# Patient Record
Sex: FEMALE | Race: WHITE | NOT HISPANIC OR LATINO | Employment: FULL TIME | URBAN - NONMETROPOLITAN AREA
[De-identification: names, ages, dates, MRNs, and addresses within clinical notes are randomized per-mention and may not be internally consistent; named-entity substitution may affect disease eponyms.]

---

## 2018-01-19 ENCOUNTER — OFFICE VISIT (OUTPATIENT)
Dept: OBSTETRICS AND GYNECOLOGY | Facility: CLINIC | Age: 33
End: 2018-01-19

## 2018-01-19 VITALS
HEART RATE: 74 BPM | HEIGHT: 69 IN | BODY MASS INDEX: 21.12 KG/M2 | DIASTOLIC BLOOD PRESSURE: 62 MMHG | WEIGHT: 142.6 LBS | SYSTOLIC BLOOD PRESSURE: 102 MMHG | RESPIRATION RATE: 16 BRPM

## 2018-01-19 DIAGNOSIS — B37.31 CANDIDA VAGINITIS: ICD-10-CM

## 2018-01-19 DIAGNOSIS — Z01.419 ENCOUNTER FOR GYNECOLOGICAL EXAMINATION WITH PAPANICOLAOU SMEAR OF CERVIX: Primary | ICD-10-CM

## 2018-01-19 DIAGNOSIS — Z30.09 GENERAL COUNSELING AND ADVICE FOR CONTRACEPTIVE MANAGEMENT: ICD-10-CM

## 2018-01-19 PROCEDURE — 99395 PREV VISIT EST AGE 18-39: CPT | Performed by: NURSE PRACTITIONER

## 2018-01-19 PROCEDURE — 87210 SMEAR WET MOUNT SALINE/INK: CPT | Performed by: NURSE PRACTITIONER

## 2018-01-19 PROCEDURE — 88142 CYTOPATH C/V THIN LAYER: CPT | Performed by: PATHOLOGY

## 2018-01-19 PROCEDURE — 88141 CYTOPATH C/V INTERPRET: CPT | Performed by: PATHOLOGY

## 2018-01-19 RX ORDER — BUSPIRONE HYDROCHLORIDE 7.5 MG/1
7.5 TABLET ORAL 2 TIMES DAILY
COMMUNITY
End: 2018-01-30 | Stop reason: SDUPTHER

## 2018-01-19 RX ORDER — EPINEPHRINE 0.3 MG/.3ML
0.3 INJECTION SUBCUTANEOUS
COMMUNITY
Start: 2017-09-18

## 2018-01-20 NOTE — PROGRESS NOTES
Subjective   Chief Complaint   Patient presents with   • Gynecologic Exam     well woman annual visit   • Contraception     Nelia Nuñez is a 32 y.o. year old  presenting to be seen for her annual exam.  Today she is interested in having mirena placed. Has had one before and did well. Currently not sexually active. Patient's last menstrual period was 2018 (exact date). Will precert and pt to RTC for insertion when next menses begins.     OB History      Para Term  AB Living    1 1        SAB TAB Ectopic Multiple Live Births                  Current birth control method: abstinence.    She is not sexually active.  In the past 12 months there has not been new sexual partners.  Condoms are not typically used.  She would not like to be screened for STD's at today's exam.     Past 6 month menstrual history:    Cycle Frequency: regular, predictable and consistent every 28 - 32 days   Menstrual cycle character: flow is typically moderately heavy   Cycle Duration: 3 - 4   Number of heavy days of flows: 2   Dysmenorrhea: moderate   PMS: is not affecting her activities of daily living   Intermenstrual bleeding present: no   Post-coital bleeding present: no     She exercises regularly: yes.  She wears her seat belt:yes.  She has concerns about domestic violence: no.  Last colonoscopy: Never  Last DEXA: Never  Last MMG: Never  Last pap:  16  History of abnormal PAP: Yes, HPV positive    The following portions of the patient's history were reviewed and updated as appropriate:problem list, current medications, allergies, past family history, past medical history, past social history and past surgical history.    Smoking status: Never Smoker                                                              Smokeless status: Never Used                        History   Alcohol Use   • Yes      Review of Systems   Constitutional: Negative.    HENT:        Sinusitis, on antibiotics   Respiratory: Negative.   "  Cardiovascular: Negative.    Gastrointestinal: Negative.    Endocrine: Negative.    Genitourinary: Negative.    Skin: Negative.    Neurological: Negative for dizziness, syncope, light-headedness and headaches.   Psychiatric/Behavioral: Negative for suicidal ideas. The patient is nervous/anxious.          Objective   /62 (BP Location: Right arm, Patient Position: Sitting, Cuff Size: Adult)  Pulse 74  Resp 16  Ht 175.3 cm (69\")  Wt 64.7 kg (142 lb 9.6 oz)  LMP 01/04/2018 (Exact Date)  Breastfeeding? No  BMI 21.06 kg/m2    General:  well developed; well nourished  no acute distress   Skin:  No suspicious lesions seen   Thyroid: normal to inspection and palpation   Breasts:  Examined in supine position  Symmetric without masses or skin dimpling  Nipples normal without inversion, lesions or discharge  There are no palpable axillary nodes  Fibrocystic changes are present both breasts without a discrete mass   Abdomen: soft, non-tender; no masses  no umbilical or inginual hernias are present  no hepato-splenomegaly  Normal findings: bowel sounds normal   Cardiac: Heart sounds are normal.  Regular rate and rhythm without murmur, gallop or rub.   Resp: Normal expansion.  Clear to auscultation.  No rales, rhonchi, or wheezing.   Psych: alert,oriented, in NAD with a full range of affect, normal behavior and no psychotic features   Pelvis: Uterus:  Clinical staff was present for exam  External genitalia:  normal appearance of the external genitalia including Bartholin's and Hondo's glands.  :  urethral meatus normal;  Vaginal:  normal pink mucosa without prolapse or lesions, discharge present -  white and thick and wet prep done: finding =  2+ yeast buds, no clue cells or trichomonads.   Cervix:  normal appearance.  Uterus:  normal size, shape and consistency  Adnexa:  normal bimanual exam of the adnexa.     Lab Review   No data reviewed    Imaging  No data reviewed       Diagnoses and all orders for this " visit:    Encounter for gynecological examination with Papanicolaou smear of cervix  -     Liquid-based Pap Smear, Screening - ThinPrep Vial, Cervix    Candida Vaginitis   -     Clotrimazole 2 % vaginal cream; Insert 1 applicator into the vagina Daily for 3 days.    General counseling and advice on contraceptive management    Will pre-cert for mirena and pt to RTC for insertion during next menses.       This note was electronically signed.    Jada Philip, APRN  January 20, 2018

## 2018-01-24 LAB
LAB AP CASE REPORT: NORMAL
LAB AP GYN ADDITIONAL INFORMATION: NORMAL
LAB AP GYN OTHER FINDINGS: NORMAL
Lab: NORMAL
PATH INTERP SPEC-IMP: NORMAL
STAT OF ADQ CVX/VAG CYTO-IMP: NORMAL

## 2018-01-30 ENCOUNTER — PROCEDURE VISIT (OUTPATIENT)
Dept: OBSTETRICS AND GYNECOLOGY | Facility: CLINIC | Age: 33
End: 2018-01-30

## 2018-01-30 VITALS
SYSTOLIC BLOOD PRESSURE: 104 MMHG | RESPIRATION RATE: 14 BRPM | HEIGHT: 69 IN | BODY MASS INDEX: 21.27 KG/M2 | DIASTOLIC BLOOD PRESSURE: 60 MMHG | WEIGHT: 143.6 LBS | HEART RATE: 72 BPM

## 2018-01-30 DIAGNOSIS — N92.0 MENORRHAGIA WITH REGULAR CYCLE: ICD-10-CM

## 2018-01-30 DIAGNOSIS — Z30.430 ENCOUNTER FOR INSERTION OF MIRENA IUD: Primary | ICD-10-CM

## 2018-01-30 LAB
B-HCG UR QL: NEGATIVE
INTERNAL NEGATIVE CONTROL: NEGATIVE
INTERNAL POSITIVE CONTROL: POSITIVE
Lab: NORMAL

## 2018-01-30 PROCEDURE — 58300 INSERT INTRAUTERINE DEVICE: CPT | Performed by: NURSE PRACTITIONER

## 2018-01-30 PROCEDURE — 81025 URINE PREGNANCY TEST: CPT | Performed by: NURSE PRACTITIONER

## 2018-01-30 RX ORDER — BUSPIRONE HYDROCHLORIDE 7.5 MG/1
7.5 TABLET ORAL 2 TIMES DAILY
Qty: 60 TABLET | Refills: 5 | Status: SHIPPED | OUTPATIENT
Start: 2018-01-30

## 2018-01-30 NOTE — PROGRESS NOTES
IUD Insertion    Patient's last menstrual period was 01/29/2018.    Date of procedure:  1/30/2018    Risks and benefits discussed? yes  All questions answered? yes  Consents given by The patient  Written consent obtained? yes    Local anesthesia used:  no    Procedure documentation:    After verifying the patient had a low probability of being pregnant and met the criteria for insertion, a sterile speculum has placed and the cervix was cleansed with an antiseptic solution.  Vaginal discharge was scant.  The anterior lip of the cervix was grasped with a tenaculum and the uterine cavity was gently sounded. There was no difficulty passing the sound through the cervix.  Cervical dilation did not need to be performed prior to placing the IUD.  The uterus was anteverted and sounded to 8 cms.  The Mirena was then prepared per the manufacturers instructions.    The Mirena was advanced to a point 2 cms from the fundus and then the arms were released from the sheath.  The device was advanced to the fundus and the device was released fully from the sheath.. The string was cut 3 cms in length.  Bleeding from the cervix was scant.    She tolerated the procedure without any difficulty.    Post procedure instructions: Call if any fever or excessive bleeding or pain.    Follow up needed:  6 weeks for string check    Diagnosis:   Encounter for insertion of Mirena IUD    This note was electronically signed.    Jada Philip, APRN  1/30/2018

## 2018-03-13 ENCOUNTER — OFFICE VISIT (OUTPATIENT)
Dept: OBSTETRICS AND GYNECOLOGY | Facility: CLINIC | Age: 33
End: 2018-03-13

## 2018-03-13 VITALS
WEIGHT: 147 LBS | RESPIRATION RATE: 14 BRPM | HEIGHT: 69 IN | BODY MASS INDEX: 21.77 KG/M2 | HEART RATE: 86 BPM | SYSTOLIC BLOOD PRESSURE: 100 MMHG | DIASTOLIC BLOOD PRESSURE: 62 MMHG

## 2018-03-13 DIAGNOSIS — J01.00 ACUTE NON-RECURRENT MAXILLARY SINUSITIS: ICD-10-CM

## 2018-03-13 DIAGNOSIS — Z30.431 IUD CHECK UP: Primary | ICD-10-CM

## 2018-03-13 PROCEDURE — 99213 OFFICE O/P EST LOW 20 MIN: CPT | Performed by: NURSE PRACTITIONER

## 2018-03-13 RX ORDER — ALBUTEROL SULFATE 90 UG/1
2 AEROSOL, METERED RESPIRATORY (INHALATION) EVERY 4 HOURS PRN
Qty: 8 G | Refills: 0 | Status: SHIPPED | OUTPATIENT
Start: 2018-03-13 | End: 2018-12-05 | Stop reason: SDUPTHER

## 2018-03-13 RX ORDER — CYCLOBENZAPRINE HCL 10 MG
10 TABLET ORAL 3 TIMES DAILY PRN
COMMUNITY

## 2018-03-13 RX ORDER — AZITHROMYCIN 250 MG/1
TABLET, FILM COATED ORAL
Qty: 6 TABLET | Refills: 0 | Status: SHIPPED | OUTPATIENT
Start: 2018-03-13 | End: 2018-06-08

## 2018-03-13 NOTE — PROGRESS NOTES
"Subjective   Nelia Nuñez is a 33 y.o. female.     History of Present Illness   Pt presents for 6 week string check on Mirena IUD. Pt states she has had consistent spotting with some clots then a defined period on 3/4-3/10 when her bleeding was light to heavy x 6 days. She notes this is very different from her normal pattern but she full understands this can occur with IUDs. She also notes some \"bloating like she is on her period\". She has not been able to check her strings. She has had intercourse and it was not painful for her or her partner.     Pt complains of a pain and pressure over the right maxillary sinus. Pt has hx of extensive facial reconstruction and has frequent sinusitis. She currently does not have a PCP.     The following portions of the patient's history were reviewed and updated as appropriate: allergies, current medications, past family history, past medical history, past social history, past surgical history and problem list.    Review of Systems   Constitutional: Negative.  Negative for chills and fever.   HENT: Positive for congestion, facial swelling (right), postnasal drip, sinus pressure and sore throat. Negative for rhinorrhea, sneezing and trouble swallowing.    Respiratory: Positive for cough and wheezing. Negative for shortness of breath.    Cardiovascular: Negative.    Genitourinary: Positive for vaginal bleeding (with IUD). Negative for dyspareunia, pelvic pain, vaginal discharge and vaginal pain.       Objective    Vitals:    03/13/18 1320   BP: 100/62   Pulse: 86   Resp: 14     1    03/13/18  1320   Weight: 66.7 kg (147 lb)     Body mass index is 21.71 kg/m².    Physical Exam   Constitutional: She is oriented to person, place, and time. She appears well-developed and well-nourished.   HENT:   Nose: Congestion present. Right sinus exhibits maxillary sinus tenderness. Right sinus exhibits no frontal sinus tenderness. Left sinus exhibits no maxillary sinus tenderness and no frontal " sinus tenderness.   Cardiovascular: Normal rate, regular rhythm and normal heart sounds.    Pulmonary/Chest: Effort normal and breath sounds normal.   Genitourinary: Vagina normal and uterus normal. There is no rash, tenderness, lesion or injury on the right labia. There is no rash, tenderness, lesion or injury on the left labia. Cervix exhibits pinkness and visible IUD strings (0.5cm from os). Cervix does not exhibit motion tenderness, discharge, friability, lesion, polyp or nabothian cyst. Right adnexum displays no mass, no tenderness and no fullness. Left adnexum displays no mass, no tenderness and no fullness. Bleeding: scant dark red.   Neurological: She is alert and oriented to person, place, and time.   Vitals reviewed.      Assessment/Plan   Nelia was seen today for follow-up.    Diagnoses and all orders for this visit:    IUD check up    Acute non-recurrent maxillary sinusitis  -     azithromycin (ZITHROMAX) 250 MG tablet; Take 2 tablets the first day, then 1 tablet daily for 4 days.    Other orders  -     albuterol (PROVENTIL HFA;VENTOLIN HFA) 108 (90 Base) MCG/ACT inhaler; Inhale 2 puffs Every 4 (Four) Hours As Needed for Wheezing.    Encouraged pt to monitor irregular bleeding. RTC if excessive or prolonging after the first 3-6 months.     Encourage pt to establish PCP. Will treat sinusitis this time only. Pro air refilled for pt. Zpak prescribed.     Pt verbalizes understanding to plan of care. All questions answered. Monitor and RTC PRN. If bloating persists after bleeding is controlled, pt to RTC for consideration of spironolactone.

## 2018-06-08 ENCOUNTER — OFFICE VISIT (OUTPATIENT)
Dept: OBSTETRICS AND GYNECOLOGY | Facility: CLINIC | Age: 33
End: 2018-06-08

## 2018-06-08 VITALS
SYSTOLIC BLOOD PRESSURE: 100 MMHG | DIASTOLIC BLOOD PRESSURE: 60 MMHG | HEART RATE: 66 BPM | BODY MASS INDEX: 22.82 KG/M2 | RESPIRATION RATE: 14 BRPM | HEIGHT: 68 IN | WEIGHT: 150.6 LBS

## 2018-06-08 DIAGNOSIS — R14.0 GENERALIZED BLOATING: ICD-10-CM

## 2018-06-08 DIAGNOSIS — Z30.431 IUD CHECK UP: Primary | ICD-10-CM

## 2018-06-08 DIAGNOSIS — Z01.89 ROUTINE LAB DRAW: ICD-10-CM

## 2018-06-08 LAB
ALBUMIN SERPL-MCNC: 4.2 G/DL (ref 3.2–5.5)
ALBUMIN/GLOB SERPL: 1.4 G/DL (ref 1–3)
ALP SERPL-CCNC: 60 U/L (ref 15–121)
ALT SERPL W P-5'-P-CCNC: 14 U/L (ref 10–60)
ANION GAP SERPL CALCULATED.3IONS-SCNC: 10 MMOL/L (ref 5–15)
AST SERPL-CCNC: 18 U/L (ref 10–60)
BASOPHILS # BLD AUTO: 0.06 10*3/MM3 (ref 0–0.2)
BASOPHILS NFR BLD AUTO: 0.7 % (ref 0–2)
BILIRUB SERPL-MCNC: 2.4 MG/DL (ref 0.2–1)
BUN BLD-MCNC: 13 MG/DL (ref 8–25)
BUN/CREAT SERPL: 18.6 (ref 7–25)
CALCIUM SPEC-SCNC: 9.3 MG/DL (ref 8.4–10.8)
CHLORIDE SERPL-SCNC: 103 MMOL/L (ref 100–112)
CHOLEST SERPL-MCNC: 225 MG/DL (ref 150–200)
CO2 SERPL-SCNC: 25 MMOL/L (ref 20–32)
CREAT BLD-MCNC: 0.7 MG/DL (ref 0.4–1.3)
DEPRECATED RDW RBC AUTO: 41.1 FL (ref 36.4–46.3)
EOSINOPHIL # BLD AUTO: 0.17 10*3/MM3 (ref 0–0.7)
EOSINOPHIL NFR BLD AUTO: 2.1 % (ref 0–7)
ERYTHROCYTE [DISTWIDTH] IN BLOOD BY AUTOMATED COUNT: 11.5 % (ref 11.5–14.5)
GFR SERPL CREATININE-BSD FRML MDRD: 96 ML/MIN/1.73 (ref 64–149)
GLOBULIN UR ELPH-MCNC: 3.1 GM/DL (ref 2.5–4.6)
GLUCOSE BLD-MCNC: 91 MG/DL (ref 70–100)
HCT VFR BLD AUTO: 41.2 % (ref 35–45)
HDLC SERPL-MCNC: 61 MG/DL (ref 35–100)
HGB BLD-MCNC: 13.6 G/DL (ref 12–15.5)
LDLC SERPL CALC-MCNC: 145 MG/DL
LDLC/HDLC SERPL: 2.37 {RATIO}
LYMPHOCYTES # BLD AUTO: 2.38 10*3/MM3 (ref 0.6–4.2)
LYMPHOCYTES NFR BLD AUTO: 29.2 % (ref 10–50)
MCH RBC QN AUTO: 32.6 PG (ref 26.5–34)
MCHC RBC AUTO-ENTMCNC: 33 G/DL (ref 31.4–36)
MCV RBC AUTO: 98.8 FL (ref 80–98)
MONOCYTES # BLD AUTO: 0.74 10*3/MM3 (ref 0–0.9)
MONOCYTES NFR BLD AUTO: 9.1 % (ref 0–12)
NEUTROPHILS # BLD AUTO: 4.81 10*3/MM3 (ref 2–8.6)
NEUTROPHILS NFR BLD AUTO: 58.9 % (ref 37–80)
PLATELET # BLD AUTO: 232 10*3/MM3 (ref 150–450)
PMV BLD AUTO: 10.1 FL (ref 8–12)
POTASSIUM BLD-SCNC: 3.8 MMOL/L (ref 3.4–5.4)
PROT SERPL-MCNC: 7.3 G/DL (ref 6.7–8.2)
RBC # BLD AUTO: 4.17 10*6/MM3 (ref 3.77–5.16)
SODIUM BLD-SCNC: 138 MMOL/L (ref 134–146)
TRIGL SERPL-MCNC: 96 MG/DL (ref 35–160)
VLDLC SERPL-MCNC: 19.2 MG/DL
WBC NRBC COR # BLD: 8.16 10*3/MM3 (ref 3.2–9.8)

## 2018-06-08 PROCEDURE — 36415 COLL VENOUS BLD VENIPUNCTURE: CPT | Performed by: NURSE PRACTITIONER

## 2018-06-08 PROCEDURE — 80053 COMPREHEN METABOLIC PANEL: CPT | Performed by: NURSE PRACTITIONER

## 2018-06-08 PROCEDURE — 80061 LIPID PANEL: CPT | Performed by: NURSE PRACTITIONER

## 2018-06-08 PROCEDURE — 85025 COMPLETE CBC W/AUTO DIFF WBC: CPT | Performed by: NURSE PRACTITIONER

## 2018-06-08 PROCEDURE — 99213 OFFICE O/P EST LOW 20 MIN: CPT | Performed by: NURSE PRACTITIONER

## 2018-06-08 RX ORDER — SPIRONOLACTONE 25 MG/1
25 TABLET ORAL DAILY
Qty: 30 TABLET | Refills: 5 | Status: SHIPPED | OUTPATIENT
Start: 2018-06-08

## 2018-06-08 NOTE — PROGRESS NOTES
Subjective   Nelia Nuñez is a 33 y.o. female.     History of Present Illness   Pt presents for me to check IUD. She notes she was working when she had sharp stabbing pain on the right side that quickly resolved. Has come and gone a few other times. Did note some diarrhea once. She was unable to palpate string after that and wanted to make sure everything was ok. Denies any pain today. Since Mirena, she has noted generalize bloating and fluid retention. Her ankles swell often and abdominal bloating is bothersome. We had discussed spironolactone at last visit and pt wishes to try.    Pt is needing a health assessment screening for her insurance. I filled out Ht, Wt, BP, HR. Will need CBC, CMP and lipid panel to record results.     Periods have been 8-10 days total. Spotting the first and last 3 days with moderate bleeding in the middle. Moderate cramps. Pt still wishes to continue Mirena use.      The following portions of the patient's history were reviewed and updated as appropriate: allergies, current medications, past family history, past medical history, past social history, past surgical history and problem list.    Review of Systems   Constitutional: Negative.  Negative for chills and fever.   Respiratory: Negative.    Cardiovascular: Positive for leg swelling (ankles). Negative for chest pain and palpitations.   Gastrointestinal: Positive for abdominal distention (bloating), diarrhea and vomiting (one time after coughing episode).   Genitourinary: Positive for menstrual problem (irregular with Mirena) and pelvic pain. Negative for difficulty urinating, dyspareunia, dysuria, flank pain, frequency, hematuria, urgency, vaginal bleeding, vaginal discharge and vaginal pain.   Neurological: Negative for dizziness, syncope and light-headedness.   Psychiatric/Behavioral: Negative.        Objective    Vitals:    06/08/18 1332   BP: 100/60   Pulse: 66   Resp: 14     Body mass index is 23.24 kg/m².    1     06/08/18  1332   Weight: 68.3 kg (150 lb 9.6 oz)     Physical Exam   Constitutional: She is oriented to person, place, and time. She appears well-developed and well-nourished.   Cardiovascular: Normal rate, regular rhythm and normal heart sounds.    Pulmonary/Chest: Effort normal and breath sounds normal.   Genitourinary: Vagina normal and uterus normal. There is no rash, tenderness or lesion on the right labia. There is no rash, tenderness or lesion on the left labia. Cervix exhibits visible IUD strings (1cm out of cervical os). Cervix does not exhibit motion tenderness. Right adnexum displays no mass, no tenderness and no fullness. Left adnexum displays no mass, no tenderness and no fullness. No tenderness or bleeding in the vagina. No vaginal discharge found.   Genitourinary Comments: Tenderness not reproduced on exam.    Musculoskeletal:   Edema of ankles bilaterally, mild, nonpitting   Neurological: She is alert and oriented to person, place, and time.   Vitals reviewed.        Assessment/Plan   Nelia was seen today for mirena string check and wants fluid pill.    Diagnoses and all orders for this visit:    IUD check up    Generalized bloating  -     spironolactone (ALDACTONE) 25 MG tablet; Take 1 tablet by mouth Daily.    Routine lab draw  -     CBC Auto Differential  -     Comprehensive Metabolic Panel  -     Lipid Panel      IUD in place. No concerns with placement and pain not reproduced on exam. If pain occurs again, I recommend pt call and we will schedule a transvaginal US for further evaluation. We discussed that ovarian cysts are more common with Mirena. This may have occurred and ruptured. She voices understanding and agreement with plan of care.     Begin spironolactone 25mg tablet once daily. Encouraged pt to monitor for any concerns. RTC in 6 months or sooner PRN.     I will obtain labs today for insurance purposes. I will call with results and we can record on paperwork.